# Patient Record
Sex: MALE | Race: WHITE | NOT HISPANIC OR LATINO | URBAN - METROPOLITAN AREA
[De-identification: names, ages, dates, MRNs, and addresses within clinical notes are randomized per-mention and may not be internally consistent; named-entity substitution may affect disease eponyms.]

---

## 2021-04-13 DIAGNOSIS — Z23 ENCOUNTER FOR IMMUNIZATION: ICD-10-CM

## 2023-02-13 ENCOUNTER — TELEPHONE (OUTPATIENT)
Dept: NEUROSURGERY | Facility: CLINIC | Age: 78
End: 2023-02-13
Payer: MEDICARE

## 2023-02-13 NOTE — TELEPHONE ENCOUNTER
Office Visit Type: New Patient - Dr. Garcia    Referring: Friend - Jose Luis العراقي    PCP: Dr. Red Goldsmith          Symptoms: severe back pain. Pt previously had leg pain but it resolved. Patient has been dealing with back pain for a very long time (chronic pain). Pt currently taking Fentanyl patch and dilaudid. He also had a spinal cord stim 5 months ago.          RADIOLOGY:    Most recent study:   MRI Lumbar Spine 02/10/23 - [facility]    Pt has disc: Yes    Xrays: nothing recent    EMG: Years ago, nothing recent    Dexa Scan: no            OTHER TREATMENT:    Physical therapy: Patient forgets the name, went to PT for multiple issues    Injections: Yes - [facility] Dr Chen (requested)    Prev Surgery: Laminectomy - 40+ years ago        Insurance: Medicare & [employer] as supplement      WC/AUTO: no        NP INTAKE DOCUMENTS ARE SCANNED UNDER MEDIA TAB IF OUTSIDE RECORDS

## 2023-02-17 DIAGNOSIS — M81.0 OSTEOPOROSIS WITHOUT CURRENT PATHOLOGICAL FRACTURE, UNSPECIFIED OSTEOPOROSIS TYPE: Primary | ICD-10-CM

## 2023-02-17 DIAGNOSIS — M41.9 SCOLIOSIS OF LUMBAR SPINE, UNSPECIFIED SCOLIOSIS TYPE: ICD-10-CM

## 2023-03-06 ENCOUNTER — HOSPITAL ENCOUNTER (OUTPATIENT)
Dept: RADIOLOGY | Facility: HOSPITAL | Age: 78
Discharge: HOME | End: 2023-03-06
Attending: PHYSICIAN ASSISTANT
Payer: MEDICARE

## 2023-03-06 DIAGNOSIS — M41.9 SCOLIOSIS OF LUMBAR SPINE, UNSPECIFIED SCOLIOSIS TYPE: ICD-10-CM

## 2023-03-06 DIAGNOSIS — M81.0 OSTEOPOROSIS WITHOUT CURRENT PATHOLOGICAL FRACTURE, UNSPECIFIED OSTEOPOROSIS TYPE: ICD-10-CM

## 2023-03-06 PROCEDURE — 72114 X-RAY EXAM L-S SPINE BENDING: CPT

## 2023-03-06 PROCEDURE — 77080 DXA BONE DENSITY AXIAL: CPT

## 2023-03-06 PROCEDURE — 72082 X-RAY EXAM ENTIRE SPI 2/3 VW: CPT

## 2023-03-16 ENCOUNTER — OFFICE VISIT (OUTPATIENT)
Dept: NEUROSURGERY | Facility: CLINIC | Age: 78
End: 2023-03-16
Payer: MEDICARE

## 2023-03-16 VITALS
WEIGHT: 190 LBS | TEMPERATURE: 97.6 F | HEIGHT: 73 IN | HEART RATE: 57 BPM | DIASTOLIC BLOOD PRESSURE: 68 MMHG | RESPIRATION RATE: 16 BRPM | SYSTOLIC BLOOD PRESSURE: 122 MMHG | BODY MASS INDEX: 25.18 KG/M2 | OXYGEN SATURATION: 97 %

## 2023-03-16 DIAGNOSIS — M48.10 DISH (DIFFUSE IDIOPATHIC SKELETAL HYPEROSTOSIS): Primary | ICD-10-CM

## 2023-03-16 PROCEDURE — 99204 OFFICE O/P NEW MOD 45 MIN: CPT | Performed by: NEUROLOGICAL SURGERY

## 2023-03-16 RX ORDER — FLECAINIDE ACETATE 150 MG/1
TABLET ORAL
COMMUNITY
Start: 2012-12-13

## 2023-03-16 RX ORDER — DILTIAZEM HYDROCHLORIDE 120 MG/1
TABLET, FILM COATED ORAL
COMMUNITY
Start: 2017-07-06

## 2023-03-16 RX ORDER — FENTANYL 75 UG/H
PATCH TRANSDERMAL
COMMUNITY

## 2023-03-16 RX ORDER — METFORMIN HYDROCHLORIDE 1000 MG/1
TABLET, FILM COATED, EXTENDED RELEASE ORAL
COMMUNITY

## 2023-03-16 RX ORDER — ROSUVASTATIN CALCIUM 5 MG/1
5 TABLET, COATED ORAL
COMMUNITY

## 2023-03-16 NOTE — PROGRESS NOTES
Dear Dr. Goldsmith,    I had the pleasure of meeting a patient of yours in the office today.  Thank you for your kind referral. As you may recall, Mr. Dm Contreras is a 76yo gentleman who is presenting with a twenty year history of back pain. His pain is described as being located in the mid to low back region and across the lumbosacral junction with some radiation bilaterally into the SI areas. He feels stiff and has lost range of motion of the years. His pain is worst in the AM and requires a few hours to loosen up. Leaning forward and bending over increase his pain. When seated his pain improves. He used to be very active in athletics, golfed, and recently was walking upwards of 3-4 miles per day. Unfortunately, in recent months he has had to reduce his activity due to low back pain. He denies lower extremity radiculopathy, numbness, or paresthesias. He denies symptoms of claudicatory back pain or neurogenic claudication when ambulating. He reports a few falls often precipitated by a trip, as he has a chronic mild right foot drop, or instability.  He has sought out therapy and pain management undergoing multiple ESIs, medial branch blocks, ablations, and SI joint injections.  He is now on chronic pain medication and has sought out other Orthopedic and Neurosurgical opinions for surgical management.  He presents today for a third opinion.     Imaging:  I have personally reviewed his MRI and ordered long cassette scoliosis and dynamic X-rays.  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  It is difficult to tell if other lumbar/sacral levels and if the sacroiliac joints are autofused.  He has multilevel lumbar stenosis from L1 to S1 to varying degrees.                    L1-2:        L2-3:        L5-S1 level showing loss of disc space height and critical foraminal stenosis compressing the exiting L5 nerve  roots:            Review of Systems: 14 point review of systems are negative except for the following pertinent positives: mild-moderate sleep apnea, periodic heart palpitations associated with atrial fib, constipation at times, tendency to bruise easily, joint pains, low back pain, neck pain, stiffness, and degenerative disc disease.     Medical History:   Past Medical History:   Diagnosis Date   • Diabetes (CMS/HCC)    • Hyperlipidemia        Surgical History:   Past Surgical History:   Procedure Laterality Date   • LAMINECTOMY     • OTHER SURGICAL HISTORY      cardiac ablation       Social History:   Social History     Socioeconomic History   • Marital status:      Spouse name: None   • Number of children: None   • Years of education: None   • Highest education level: None   Tobacco Use   • Smoking status: Never   • Smokeless tobacco: Never   Substance and Sexual Activity   • Alcohol use: Not Currently       Family History:   Family History   Problem Relation Age of Onset   • Aneurysm Biological Mother    • Kidney failure Biological Father        Allergies: Epinephrine    Home Medications:  Not in a hospital admission.    Current Medications:        Physicial Exam    Vital Signs   Vitals:    03/16/23 1422   BP: 122/68   Pulse: (!) 57   Resp: 16   Temp: 36.4 °C (97.6 °F)   SpO2: 97%       Awake, alert, oriented to person, place, time and situation; speech and fund of knowledge normal. Head NC/AT.  PERRL, extra-ocular movements intact, no nystagmus or diplopia.  Neck is supple, has full range of motion and there is no evidence of JVD.  There is no tenderness to palpation.  Breathing comfortably without distress, tachypnea.  Heart has a regular rate. Abdomen ND.   Skin is warm.  Limbs show no deformities or edema.    Neurological examination:    Motor:     RUE:  D  5/5, B  5/5, T 5/5, WE 5/5, HG 5/5, IH 5/5   LUE:  D  5/5, B  5/5, T 5/5, WE 5/5, HG 5/5, IH 5/5   RLE:  IP  5/5, Q  5/5, DF 4/5, EHL 4/5, PF  "5/5   LLE:  IP  5/5, Q  5/5, DF 5/5, EHL 5/5, PF 5/5  Reflexes:  Normoreflexive, no Peters's or Babinski signs, no clonus  Sensory exam:  Intact to light touch  He has a somewhat forward and very straight posture.  Limited ROM.      Assessment/Plan     This is a 76 yo M s/p a long-standing history of severe mechanical LBP and severe neck pain.  He has had most possible treatments and procedures to help with LBP.  I explained LBP is difficult to \"cure\" with surgical intervention.  He has no associated radicular pain, neurological deficits or claudication, purely LBP.  I had a long and tanja discussion about the limitations of \"back pain surgery\" and it sounds like Dr. Baig and Pamela had similar reservations.  DISH patients have long lever arm forces on the remaining/neighboring motion segments.  We can obtain a CT of C/T/L spine to determine which levels and joints are fused and this may allow us to narrow down therapeutic targets.  He can follow-up after CT imaging obtained.    All questions were answered.  Thank you very much for the opportunity to be involved in the care of your patient.  Please call the office should any questions or problems arise.    Sincerely,       Ian Garcia MD          "

## 2023-03-16 NOTE — LETTER
March 21, 2023     Red Goldsmith MD  [address] [facility]  [place] [address]    Patient: Dm Contreras  YOB: 1945  Date of Visit: 3/16/2023      Dear Dr. Goldsmith:    Thank you for referring Dm Contreras to me for evaluation. Below are my notes for this consultation.    If you have questions, please do not hesitate to call me. I look forward to following your patient along with you.         Sincerely,        Ian Garcia MD        CC: No Recipients    Ian Garcia MD  3/17/2023  6:56 AM  Signed  Dear Dr. Goldsmith,    I had the pleasure of meeting a patient of yours in the office today.  Thank you for your kind referral. As you may recall, Mr. Dm Contreras is a 76yo gentleman who is presenting with a twenty year history of back pain. His pain is described as being located in the mid to low back region and across the lumbosacral junction with some radiation bilaterally into the SI areas. He feels stiff and has lost range of motion of the years. His pain is worst in the AM and requires a few hours to loosen up. Leaning forward and bending over increase his pain. When seated his pain improves. He used to be very active in athletics, golfed, and recently was walking upwards of 3-4 miles per day. Unfortunately, in recent months he has had to reduce his activity due to low back pain. He denies lower extremity radiculopathy, numbness, or paresthesias. He denies symptoms of claudicatory back pain or neurogenic claudication when ambulating. He reports a few falls often precipitated by a trip, as he has a chronic mild right foot drop, or instability.  He has sought out therapy and pain management undergoing multiple ESIs, medial branch blocks, ablations, and SI joint injections.  He is now on chronic pain medication and has sought out other Orthopedic and Neurosurgical opinions for surgical management.  He presents today for a third opinion.     Imaging:  I have personally reviewed his MRI and ordered  long cassette scoliosis and dynamic X-rays.  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  It is difficult to tell if other lumbar/sacral levels and if the sacroiliac joints are autofused.  He has multilevel lumbar stenosis from L1 to S1 to varying degrees.                    L1-2:        L2-3:        L5-S1 level showing loss of disc space height and critical foraminal stenosis compressing the exiting L5 nerve roots:            Review of Systems: 14 point review of systems are negative except for the following pertinent positives: mild-moderate sleep apnea, periodic heart palpitations associated with atrial fib, constipation at times, tendency to bruise easily, joint pains, low back pain, neck pain, stiffness, and degenerative disc disease.     Medical History:   Past Medical History:   Diagnosis Date   • Diabetes (CMS/HCC)    • Hyperlipidemia        Surgical History:   Past Surgical History:   Procedure Laterality Date   • LAMINECTOMY     • OTHER SURGICAL HISTORY      cardiac ablation       Social History:   Social History     Socioeconomic History   • Marital status:      Spouse name: None   • Number of children: None   • Years of education: None   • Highest education level: None   Tobacco Use   • Smoking status: Never   • Smokeless tobacco: Never   Substance and Sexual Activity   • Alcohol use: Not Currently       Family History:   Family History   Problem Relation Age of Onset   • Aneurysm Biological Mother    • Kidney failure Biological Father        Allergies: Epinephrine    Home Medications:  Not in a hospital admission.    Current Medications:        Physicial Exam    Vital Signs   Vitals:    03/16/23 1422   BP: 122/68   Pulse: (!) 57   Resp: 16   Temp: 36.4 °C (97.6 °F)   SpO2: 97%       Awake, alert, oriented to person, place, time and situation; speech and fund of knowledge normal. Head NC/AT.   "PERRL, extra-ocular movements intact, no nystagmus or diplopia.  Neck is supple, has full range of motion and there is no evidence of JVD.  There is no tenderness to palpation.  Breathing comfortably without distress, tachypnea.  Heart has a regular rate. Abdomen ND.   Skin is warm.  Limbs show no deformities or edema.    Neurological examination:    Motor:     RUE:  D  5/5, B  5/5, T 5/5, WE 5/5, HG 5/5, IH 5/5   LUE:  D  5/5, B  5/5, T 5/5, WE 5/5, HG 5/5, IH 5/5   RLE:  IP  5/5, Q  5/5, DF 4/5, EHL 4/5, PF 5/5   LLE:  IP  5/5, Q  5/5, DF 5/5, EHL 5/5, PF 5/5  Reflexes:  Normoreflexive, no Peters's or Babinski signs, no clonus  Sensory exam:  Intact to light touch  He has a somewhat forward and very straight posture.  Limited ROM.      Assessment/Plan     This is a 76 yo M s/p a long-standing history of severe mechanical LBP and severe neck pain.  He has had most possible treatments and procedures to help with LBP.  I explained LBP is difficult to \"cure\" with surgical intervention.  He has no associated radicular pain, neurological deficits or claudication, purely LBP.  I had a long and tanja discussion about the limitations of \"back pain surgery\" and it sounds like Dr. Baig and Pamela had similar reservations.  DISH patients have long lever arm forces on the remaining/neighboring motion segments.  We can obtain a CT of C/T/L spine to determine which levels and joints are fused and this may allow us to narrow down therapeutic targets.  He can follow-up after CT imaging obtained.    All questions were answered.  Thank you very much for the opportunity to be involved in the care of your patient.  Please call the office should any questions or problems arise.    Sincerely,       Ian Garcia MD            "

## 2023-04-11 ENCOUNTER — HOSPITAL ENCOUNTER (OUTPATIENT)
Dept: RADIOLOGY | Facility: HOSPITAL | Age: 78
Discharge: HOME | End: 2023-04-11
Attending: NEUROLOGICAL SURGERY
Payer: MEDICARE

## 2023-04-11 DIAGNOSIS — M48.10 DISH (DIFFUSE IDIOPATHIC SKELETAL HYPEROSTOSIS): ICD-10-CM

## 2023-04-11 PROCEDURE — 72125 CT NECK SPINE W/O DYE: CPT | Mod: ME

## 2023-04-11 PROCEDURE — G1004 CDSM NDSC: HCPCS

## 2023-04-18 ENCOUNTER — OFFICE VISIT (OUTPATIENT)
Dept: NEUROSURGERY | Facility: CLINIC | Age: 78
End: 2023-04-18
Payer: MEDICARE

## 2023-04-18 VITALS
SYSTOLIC BLOOD PRESSURE: 134 MMHG | HEIGHT: 73 IN | HEART RATE: 58 BPM | WEIGHT: 187 LBS | OXYGEN SATURATION: 98 % | DIASTOLIC BLOOD PRESSURE: 67 MMHG | BODY MASS INDEX: 24.78 KG/M2 | TEMPERATURE: 97.8 F | RESPIRATION RATE: 18 BRPM

## 2023-04-18 DIAGNOSIS — M41.9 KYPHOSCOLIOSIS DEFORMITY OF SPINE: Primary | ICD-10-CM

## 2023-04-18 PROCEDURE — 99213 OFFICE O/P EST LOW 20 MIN: CPT | Performed by: NEUROLOGICAL SURGERY

## 2023-04-18 RX ORDER — LUBIPROSTONE 24 UG/1
CAPSULE ORAL
COMMUNITY
Start: 2023-04-12

## 2023-04-18 RX ORDER — HYDROMORPHONE HYDROCHLORIDE 4 MG/1
4 TABLET ORAL EVERY 8 HOURS
COMMUNITY
Start: 2023-03-26

## 2023-04-18 NOTE — PROGRESS NOTES
Dear Dr. Goldsmith,    Mr. Contreras  Return to the office for follow-up after completing CAT scan studies of the entire spine.  I initially evaluated him on 3/16/2023 and the details of my initial assessment and plan are documented in that note.  As you may recall, in brief, he is a 77-year-old gentleman with severe lower back pain.  It is based in the lumbosacral and sacroiliac regions.  He has severe degeneration of essentially every disc of his spine with vacuum disc phenomenon at multiple levels, nearly complete loss of disc space height and cartilage at most levels.  He has flatback and positive sagittal balance predominantly from loss of his lumbar lordosis.  He has no radicular pain.  He did not respond to sacroiliac blocks, facet/medial branch blocks, epidural injections, etc.  He is tried physical therapy, stretching exercises, etc.    She does have a CAT scan which does show that he is likely autofused across the L2-3 and L3-4 disc spaces via lateral osteophytes on the left at L2-3 and on the right side at L3-4.  He is attempting to bridge osteophytes across multiple other levels of his spine and he is autofused across multiple segments of the cervical spine.  The L5 spinous processes also appear to be contacting the superior aspect of the sacral ala bilaterally.  I cannot tell if he has fused across the left sided transverse process/ala junction on the left.    As noted in the prior note, I find it difficult to come up with an operation for his back pain given his lack of response to various diagnostic injections which are the gold standard for diagnosing sacroiliac joint dysfunction in particular, lack of radicular pain or stenosis like symptoms such as claudication, the number of levels involved in his spinal degeneration and his overall posture.  We certainly do surgeries that can help correct posture and restore sagittal alignment.  However, overall, it is very difficult to cure mechanical lower back pain  given the vast multitude of potential pain generators in his case.    I had a long and tanja discussion with him that I would find it hard to come up with an operation that would cure his back pain.  I have recommended he be evaluated by an adult deformity specialist at [facility] named Bimal Pardo and he is agreeable.    It may be advantageous to repeat diagnostic injections such as sacroiliac injections or medial branch blocks particulate L4-5 and L5-S1 to see if he achieves even temporary relief with a strict pain diary concentrating on the first 6 to 8 hours after the block.  Discograms can be diagnostic in some cases as well, again focusing on the L4-5 and L5-S1 disc spaces based upon the lumbosacral location of his pain.  Again he is fused as best I can tell across L2-L4.    Other potential causes for his back pain include cluneal neuralgia, Bertolotti syndrome (mechanical contact and rubbing of the, in his case, L5 transverse processes against the sacral ala), etc.    I await the evaluation by Dr. Pardo given his extensive experience with challenging cases such as this.  All questions were answered to the best of my abilities.  I spent 25 minutes on this date of service performing the following activities: Preparing for the visit, obtaining/reviewing records, independently reviewing studies, obtaining history/performing examination, counseling and education, ordering tests, medications and studies, communicating results, coordinating care and documentation.    Thank you very much for the opportunity to be involved in the care of your patient.  Please call the office should any questions or problems arise.    Sincerely,       Ian Garcia MD

## 2023-04-18 NOTE — LETTER
April 18, 2023     Red Goldsmith MD  [address] [facility]  [place]  [place] [address]    Patient: Dm Contreras  YOB: 1945  Date of Visit: 4/18/2023      Dear Dr. Goldsmith:    Thank you for referring Dm Contreras to me for evaluation. Below are my notes for this consultation.    If you have questions, please do not hesitate to call me. I look forward to following your patient along with you.         Sincerely,        Ian Garcia MD        CC: No Recipients    Ian Garcia MD  4/18/2023  3:53 PM  Signed  Dear Dr. Goldsmith,    Mr. Contreras  Return to the office for follow-up after completing CAT scan studies of the entire spine.  I initially evaluated him on 3/16/2023 and the details of my initial assessment and plan are documented in that note.  As you may recall, in brief, he is a 77-year-old gentleman with severe lower back pain.  It is based in the lumbosacral and sacroiliac regions.  He has severe degeneration of essentially every disc of his spine with vacuum disc phenomenon at multiple levels, nearly complete loss of disc space height and cartilage at most levels.  He has flatback and positive sagittal balance predominantly from loss of his lumbar lordosis.  He has no radicular pain.  He did not respond to sacroiliac blocks, facet/medial branch blocks, epidural injections, etc.  He is tried physical therapy, stretching exercises, etc.    She does have a CAT scan which does show that he is likely autofused across the L2-3 and L3-4 disc spaces via lateral osteophytes on the left at L2-3 and on the right side at L3-4.  He is attempting to bridge osteophytes across multiple other levels of his spine and he is autofused across multiple segments of the cervical spine.  The L5 spinous processes also appear to be contacting the superior aspect of the sacral ala bilaterally.  I cannot tell if he has fused across the left sided transverse process/ala junction on the left.    As noted in the prior note, I  find it difficult to come up with an operation for his back pain given his lack of response to various diagnostic injections which are the gold standard for diagnosing sacroiliac joint dysfunction in particular, lack of radicular pain or stenosis like symptoms such as claudication, the number of levels involved in his spinal degeneration and his overall posture.  We certainly do surgeries that can help correct posture and restore sagittal alignment.  However, overall, it is very difficult to cure mechanical lower back pain given the vast multitude of potential pain generators in his case.    I had a long and tanja discussion with him that I would find it hard to come up with an operation that would cure his back pain.  I have recommended he be evaluated by an adult deformity specialist at [facility] named Bimal Pardo and he is agreeable.    It may be advantageous to repeat diagnostic injections such as sacroiliac injections or medial branch blocks particulate L4-5 and L5-S1 to see if he achieves even temporary relief with a strict pain diary concentrating on the first 6 to 8 hours after the block.  Discograms can be diagnostic in some cases as well, again focusing on the L4-5 and L5-S1 disc spaces based upon the lumbosacral location of his pain.  Again he is fused as best I can tell across L2-L4.    Other potential causes for his back pain include cluneal neuralgia, Bertolotti syndrome (mechanical contact and rubbing of the, in his case, L5 transverse processes against the sacral ala), etc.    I await the evaluation by Dr. Pardo given his extensive experience with challenging cases such as this.  All questions were answered to the best of my abilities.  I spent 25 minutes on this date of service performing the following activities: Preparing for the visit, obtaining/reviewing records, independently reviewing studies, obtaining history/performing examination, counseling and education, ordering  tests, medications and studies, communicating results, coordinating care and documentation.    Thank you very much for the opportunity to be involved in the care of your patient.  Please call the office should any questions or problems arise.    Sincerely,       Ian Garcia MD